# Patient Record
Sex: FEMALE | Race: WHITE | NOT HISPANIC OR LATINO | URBAN - METROPOLITAN AREA
[De-identification: names, ages, dates, MRNs, and addresses within clinical notes are randomized per-mention and may not be internally consistent; named-entity substitution may affect disease eponyms.]

---

## 2020-05-19 ENCOUNTER — OFFICE VISIT - RIVER FALLS (OUTPATIENT)
Dept: FAMILY MEDICINE | Facility: CLINIC | Age: 25
End: 2020-05-19

## 2020-05-19 ENCOUNTER — COMMUNICATION - RIVER FALLS (OUTPATIENT)
Dept: FAMILY MEDICINE | Facility: CLINIC | Age: 25
End: 2020-05-19

## 2020-05-19 ASSESSMENT — MIFFLIN-ST. JEOR: SCORE: 1977.63

## 2020-05-22 ENCOUNTER — AMBULATORY - RIVER FALLS (OUTPATIENT)
Dept: FAMILY MEDICINE | Facility: CLINIC | Age: 25
End: 2020-05-22

## 2022-02-11 VITALS
WEIGHT: 279.2 LBS | SYSTOLIC BLOOD PRESSURE: 126 MMHG | HEIGHT: 63 IN | DIASTOLIC BLOOD PRESSURE: 80 MMHG | BODY MASS INDEX: 49.47 KG/M2 | TEMPERATURE: 97.5 F | OXYGEN SATURATION: 99 % | HEART RATE: 91 BPM

## 2022-02-16 NOTE — PROGRESS NOTES
Patient:   EFRAÍN HENRY            MRN: [id number]            FIN: [id number]               Age:   24 years     Sex:  Female     :  1995   Associated Diagnoses:   Encounter for examination required by Department of Transportation (DOT)   Author:   Rosa Landaverde      Visit Information      Date of Service: 2020 09:00 am  Performing Location: [facility]  Encounter#: [id number]      Chief Complaint   2020 8:49 AM CDT    Have a [employer] employee physical as well as DOT exam, needs TB, UA        History of Present Illness   here for DOT physical, please see scanned history/Physical exam  will be working for Have a heart and needs mantoux test today also      Health Status   Allergies:    Allergic Reactions (Selected)  Severity Not Documented  Zofran (Hives)   Medications:  (Selected)   Documented Medications  Documented  Humate-P intravenous injection: Instructions: as prn, 0 Refill(s), Type: Maintenance  ferrous sulfate: Oral, daily, 0 Refill(s), Type: Maintenance  tranexamic acid 650 mg oral tablet: = 1 tab(s) ( 650 mg ), Oral, bid, Instructions: as needed, 0 Refill(s), Type: Maintenance,    Medications          *denotes recorded medication          *Humate-P intravenous injection: as prn, 0 Refill(s).          *ferrous sulfate: Oral, daily, 0 Refill(s).          *tranexamic acid 650 mg oral tablet: 650 mg, 1 tab(s), Oral, bid, as needed, 0 Refill(s).       Problem list:    All Problems  Morbid obesity / SNOMED CT [id number] / Probable      Histories   Past Medical History:    No active or resolved past medical history items have been selected or recorded.   Family History:    No family history items have been selected or recorded.   Procedure history:    No active procedure history items have been selected or recorded.   Social History:             No active social history items have been recorded.      Physical Examination   VS/Measurements   General:  Alert and oriented.    Eye:   Pupils are equal, round and reactive to light, Extraocular movements are intact, Normal conjunctiva, vision and hearing exam acceptable to A[employer] standards.    HENT:  Tympanic membranes are clear, Oral mucosa is moist, No pharyngeal erythema, No sinus tenderness.    Neck:  Supple, Non-tender, No lymphadenopathy, No thyromegaly, adequate ROM.    Respiratory:  Lungs are clear to auscultation, Respirations are non-labored, Breath sounds are equal, Symmetrical chest wall expansion, No chest wall tenderness.    Cardiovascular:  Normal rate, Regular rhythm, No murmur, No gallop, Normal peripheral perfusion.    Gastrointestinal:  Soft, Non-tender, Non-distended, No organomegaly.    Genitourinary:  No costovertebral angle tenderness, no evidence of hernia.    Musculoskeletal:  Normal range of motion, Normal strength, No tenderness, No swelling, No deformity, Normal gait.    Integumentary:  Warm, Dry, Pink, No rash.    Neurologic:  Alert, Oriented, Normal sensory, Normal motor function, No focal deficits, Cranial Nerves II-XII are grossly intact, Normal deep tendon reflexes.    Psychiatric:  Cooperative, Appropriate mood & affect, Normal judgment, Non-suicidal.       Impression and Plan   Diagnosis     Encounter for examination required by Department of Transportation (DOT) (PFO17-XS Z02.89).     Patient Instructions:       Counseled: Patient, Regarding diagnosis, Regarding treatment, Regarding medications, Verbalized understanding.    Orders     See scanned document for specifics regarding DOT clearance.

## 2022-02-16 NOTE — NURSING NOTE
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 :   Yes   Medication History Verified? :   Yes   Medical History Verified? :   No   Pre-Visit Planning Status :   Not completed   Tobacco Use? :   Never smoker   Renee Carias LPN - 5/19/2020 8:49 AM CDT   Meds / Allergies   (As Of: 5/19/2020 8:57:16 AM CDT)   Allergies (Active)   Zofran  Estimated Onset Date:   Unspecified ; Reactions:   Hives ; Created By:   Renee Carias LPN; Reaction Status:   Active ; Category:   Drug ; Substance:   Zofran ; Type:   Allergy ; Updated By:   Renee Carias LPN; Source:   Patient ; Reviewed Date:   5/19/2020 8:50 AM CDT        Medication List   (As Of: 5/19/2020 8:57:16 AM CDT)   Home Meds    tranexamic acid  :   tranexamic acid ; Status:   Documented ; Ordered As Mnemonic:   tranexamic acid 650 mg oral tablet ; Simple Display Line:   650 mg, 1 tab(s), Oral, bid, as needed, 0 Refill(s) ; Catalog Code:   tranexamic acid ; Order Dt/Tm:   5/19/2020 8:53:13 AM CDT          antihemophilic factor-von Willebrand factor  :   antihemophilic factor-von Willebrand factor ; Status:   Documented ; Ordered As Mnemonic:   Humate-P intravenous injection ; Simple Display Line:    as prn, 0 Refill(s) ; Catalog Code:   antihemophilic-von Willebrand factor ; Order Dt/Tm:   5/19/2020 8:51:20 AM CDT          ferrous sulfate  :   ferrous sulfate ; Status:   Documented ; Ordered As Mnemonic:   ferrous sulfate ; Simple Display Line:   Oral, daily, 0 Refill(s) ; Catalog Code:   ferrous sulfate ; Order Dt/Tm:   5/19/2020 8:54:02 AM CDT            Vision Testing POC   Corrective Lenses :   Glasses   Eye, Left with Correction Visual Acuity :   20/20   Eye, Right with Correction Visual Acuity :   20/20   Eye, Bilat w/Correction Visual Acuity :   20/20   Renee Carias LPN - 5/19/2020 8:49 AM CDT   ID Risk Screen   Recent Travel History :   No recent travel   Family Member Travel History :   No recent travel   Other Exposure to Infectious Disease :   Unknown   Renee Carias LPN - 5/19/2020 8:49 AM CDT

## 2022-02-16 NOTE — NURSING NOTE
PPD Reading POC Entered On:  5/22/2020 9:19 AM CDT    Performed On:  5/22/2020 9:10 AM CDT by Maggie Mar LPN               PPD Reading   PPD mm of Induration :   0 mm   PPD Interpretation :   Negative   Maggie Mar LPN - 5/22/2020 9:18 AM CDT